# Patient Record
Sex: FEMALE | Race: OTHER | NOT HISPANIC OR LATINO | URBAN - METROPOLITAN AREA
[De-identification: names, ages, dates, MRNs, and addresses within clinical notes are randomized per-mention and may not be internally consistent; named-entity substitution may affect disease eponyms.]

---

## 2022-01-17 ENCOUNTER — INPATIENT (INPATIENT)
Facility: HOSPITAL | Age: 32
LOS: 5 days | Discharge: HOME | End: 2022-01-23
Attending: OBSTETRICS & GYNECOLOGY | Admitting: OBSTETRICS & GYNECOLOGY
Payer: MEDICAID

## 2022-01-17 VITALS — WEIGHT: 179.9 LBS | HEIGHT: 63 IN

## 2022-01-17 DIAGNOSIS — Z3A.00 WEEKS OF GESTATION OF PREGNANCY NOT SPECIFIED: ICD-10-CM

## 2022-01-17 DIAGNOSIS — O26.899 OTHER SPECIFIED PREGNANCY RELATED CONDITIONS, UNSPECIFIED TRIMESTER: ICD-10-CM

## 2022-01-17 DIAGNOSIS — Z34.82 ENCOUNTER FOR SUPERVISION OF OTHER NORMAL PREGNANCY, SECOND TRIMESTER: ICD-10-CM

## 2022-01-17 LAB
ALBUMIN SERPL ELPH-MCNC: 2.2 G/DL — LOW (ref 3.5–5)
ALP SERPL-CCNC: 306 U/L — HIGH (ref 40–120)
ALT FLD-CCNC: 46 U/L DA — SIGNIFICANT CHANGE UP (ref 10–60)
ANION GAP SERPL CALC-SCNC: 6 MMOL/L — SIGNIFICANT CHANGE UP (ref 5–17)
APPEARANCE UR: CLEAR — SIGNIFICANT CHANGE UP
APTT BLD: 28.5 SEC — SIGNIFICANT CHANGE UP (ref 27.5–35.5)
AST SERPL-CCNC: 27 U/L — SIGNIFICANT CHANGE UP (ref 10–40)
BASOPHILS # BLD AUTO: 0.03 K/UL — SIGNIFICANT CHANGE UP (ref 0–0.2)
BASOPHILS NFR BLD AUTO: 0.3 % — SIGNIFICANT CHANGE UP (ref 0–2)
BILIRUB SERPL-MCNC: 0.3 MG/DL — SIGNIFICANT CHANGE UP (ref 0.2–1.2)
BILIRUB UR-MCNC: NEGATIVE — SIGNIFICANT CHANGE UP
BLD GP AB SCN SERPL QL: SIGNIFICANT CHANGE UP
BUN SERPL-MCNC: 6 MG/DL — LOW (ref 7–18)
CALCIUM SERPL-MCNC: 8.2 MG/DL — LOW (ref 8.4–10.5)
CHLORIDE SERPL-SCNC: 112 MMOL/L — HIGH (ref 96–108)
CO2 SERPL-SCNC: 22 MMOL/L — SIGNIFICANT CHANGE UP (ref 22–31)
COLOR SPEC: YELLOW — SIGNIFICANT CHANGE UP
CREAT ?TM UR-MCNC: 120 MG/DL — SIGNIFICANT CHANGE UP
CREAT SERPL-MCNC: 0.53 MG/DL — SIGNIFICANT CHANGE UP (ref 0.5–1.3)
DIFF PNL FLD: NEGATIVE — SIGNIFICANT CHANGE UP
EOSINOPHIL # BLD AUTO: 0.22 K/UL — SIGNIFICANT CHANGE UP (ref 0–0.5)
EOSINOPHIL NFR BLD AUTO: 2.1 % — SIGNIFICANT CHANGE UP (ref 0–6)
FIBRINOGEN PPP-MCNC: 828 MG/DL — HIGH (ref 290–520)
GLUCOSE SERPL-MCNC: 79 MG/DL — SIGNIFICANT CHANGE UP (ref 70–99)
GLUCOSE UR QL: NEGATIVE — SIGNIFICANT CHANGE UP
HCT VFR BLD CALC: 27 % — LOW (ref 34.5–45)
HGB BLD-MCNC: 8.2 G/DL — LOW (ref 11.5–15.5)
HIV 1 & 2 AB SERPL IA.RAPID: SIGNIFICANT CHANGE UP
IMM GRANULOCYTES NFR BLD AUTO: 0.6 % — SIGNIFICANT CHANGE UP (ref 0–1.5)
INR BLD: 0.89 RATIO — SIGNIFICANT CHANGE UP (ref 0.88–1.16)
KETONES UR-MCNC: NEGATIVE — SIGNIFICANT CHANGE UP
LDH SERPL L TO P-CCNC: 191 U/L — SIGNIFICANT CHANGE UP (ref 120–225)
LEUKOCYTE ESTERASE UR-ACNC: ABNORMAL
LYMPHOCYTES # BLD AUTO: 1.89 K/UL — SIGNIFICANT CHANGE UP (ref 1–3.3)
LYMPHOCYTES # BLD AUTO: 17.8 % — SIGNIFICANT CHANGE UP (ref 13–44)
MCHC RBC-ENTMCNC: 23.3 PG — LOW (ref 27–34)
MCHC RBC-ENTMCNC: 30.4 GM/DL — LOW (ref 32–36)
MCV RBC AUTO: 76.7 FL — LOW (ref 80–100)
MONOCYTES # BLD AUTO: 0.94 K/UL — HIGH (ref 0–0.9)
MONOCYTES NFR BLD AUTO: 8.8 % — SIGNIFICANT CHANGE UP (ref 2–14)
NEUTROPHILS # BLD AUTO: 7.5 K/UL — HIGH (ref 1.8–7.4)
NEUTROPHILS NFR BLD AUTO: 70.4 % — SIGNIFICANT CHANGE UP (ref 43–77)
NITRITE UR-MCNC: NEGATIVE — SIGNIFICANT CHANGE UP
NRBC # BLD: 0 /100 WBCS — SIGNIFICANT CHANGE UP (ref 0–0)
PH UR: 6.5 — SIGNIFICANT CHANGE UP (ref 5–8)
PLATELET # BLD AUTO: 395 K/UL — SIGNIFICANT CHANGE UP (ref 150–400)
POTASSIUM SERPL-MCNC: 3.8 MMOL/L — SIGNIFICANT CHANGE UP (ref 3.5–5.3)
POTASSIUM SERPL-SCNC: 3.8 MMOL/L — SIGNIFICANT CHANGE UP (ref 3.5–5.3)
PROT ?TM UR-MCNC: 39 MG/DL — HIGH (ref 0–12)
PROT SERPL-MCNC: 6.6 G/DL — SIGNIFICANT CHANGE UP (ref 6–8.3)
PROT UR-MCNC: 15
PROTHROM AB SERPL-ACNC: 10.6 SEC — SIGNIFICANT CHANGE UP (ref 10.6–13.6)
RBC # BLD: 3.52 M/UL — LOW (ref 3.8–5.2)
RBC # FLD: 16.9 % — HIGH (ref 10.3–14.5)
SARS-COV-2 RNA SPEC QL NAA+PROBE: DETECTED
SODIUM SERPL-SCNC: 140 MMOL/L — SIGNIFICANT CHANGE UP (ref 135–145)
SP GR SPEC: 1.01 — SIGNIFICANT CHANGE UP (ref 1.01–1.02)
URATE SERPL-MCNC: 2.6 MG/DL — SIGNIFICANT CHANGE UP (ref 2.5–7)
UROBILINOGEN FLD QL: NEGATIVE — SIGNIFICANT CHANGE UP
WBC # BLD: 10.64 K/UL — HIGH (ref 3.8–10.5)
WBC # FLD AUTO: 10.64 K/UL — HIGH (ref 3.8–10.5)

## 2022-01-17 RX ORDER — FERROUS SULFATE 325(65) MG
325 TABLET ORAL DAILY
Refills: 0 | Status: DISCONTINUED | OUTPATIENT
Start: 2022-01-17 | End: 2022-01-22

## 2022-01-17 RX ORDER — SODIUM CHLORIDE 9 MG/ML
1000 INJECTION, SOLUTION INTRAVENOUS ONCE
Refills: 0 | Status: DISCONTINUED | OUTPATIENT
Start: 2022-01-17 | End: 2022-01-17

## 2022-01-17 RX ORDER — CEFAZOLIN SODIUM 1 G
2000 VIAL (EA) INJECTION ONCE
Refills: 0 | Status: DISCONTINUED | OUTPATIENT
Start: 2022-01-17 | End: 2022-01-17

## 2022-01-17 RX ORDER — OXYTOCIN 10 UNIT/ML
333.33 VIAL (ML) INJECTION
Qty: 20 | Refills: 0 | Status: DISCONTINUED | OUTPATIENT
Start: 2022-01-17 | End: 2022-01-17

## 2022-01-17 RX ORDER — SODIUM CHLORIDE 9 MG/ML
1000 INJECTION, SOLUTION INTRAVENOUS
Refills: 0 | Status: DISCONTINUED | OUTPATIENT
Start: 2022-01-17 | End: 2022-01-18

## 2022-01-17 RX ORDER — CITRIC ACID/SODIUM CITRATE 300-500 MG
30 SOLUTION, ORAL ORAL ONCE
Refills: 0 | Status: DISCONTINUED | OUTPATIENT
Start: 2022-01-17 | End: 2022-01-17

## 2022-01-17 RX ORDER — FAMOTIDINE 10 MG/ML
20 INJECTION INTRAVENOUS ONCE
Refills: 0 | Status: DISCONTINUED | OUTPATIENT
Start: 2022-01-17 | End: 2022-01-17

## 2022-01-17 RX ADMIN — Medication 12 MILLIGRAM(S): at 16:45

## 2022-01-17 NOTE — PATIENT PROFILE OB - FALL HARM RISK - UNIVERSAL INTERVENTIONS
Bed in lowest position, wheels locked, appropriate side rails in place/Call bell, personal items and telephone in reach/Instruct patient to call for assistance before getting out of bed or chair/Non-slip footwear when patient is out of bed/[place] to call system/Physically safe environment - no spills, clutter or unnecessary equipment/Purposeful Proactive Rounding/Room/bathroom lighting operational, light cord in reach

## 2022-01-18 LAB
ABO RH CONFIRMATION: SIGNIFICANT CHANGE UP
ALBUMIN SERPL ELPH-MCNC: 2.1 G/DL — LOW (ref 3.5–5)
ALP SERPL-CCNC: 298 U/L — HIGH (ref 40–120)
ALT FLD-CCNC: 48 U/L DA — SIGNIFICANT CHANGE UP (ref 10–60)
ANION GAP SERPL CALC-SCNC: 9 MMOL/L — SIGNIFICANT CHANGE UP (ref 5–17)
AST SERPL-CCNC: 24 U/L — SIGNIFICANT CHANGE UP (ref 10–40)
BASOPHILS # BLD AUTO: 0.02 K/UL — SIGNIFICANT CHANGE UP (ref 0–0.2)
BASOPHILS NFR BLD AUTO: 0.2 % — SIGNIFICANT CHANGE UP (ref 0–2)
BILIRUB SERPL-MCNC: 0.4 MG/DL — SIGNIFICANT CHANGE UP (ref 0.2–1.2)
BUN SERPL-MCNC: 5 MG/DL — LOW (ref 7–18)
CALCIUM SERPL-MCNC: 9.1 MG/DL — SIGNIFICANT CHANGE UP (ref 8.4–10.5)
CHLORIDE SERPL-SCNC: 108 MMOL/L — SIGNIFICANT CHANGE UP (ref 96–108)
CO2 SERPL-SCNC: 22 MMOL/L — SIGNIFICANT CHANGE UP (ref 22–31)
COLLECT DURATION TIME UR: 24 HR — SIGNIFICANT CHANGE UP
CREAT SERPL-MCNC: 0.51 MG/DL — SIGNIFICANT CHANGE UP (ref 0.5–1.3)
EOSINOPHIL # BLD AUTO: 0 K/UL — SIGNIFICANT CHANGE UP (ref 0–0.5)
EOSINOPHIL NFR BLD AUTO: 0 % — SIGNIFICANT CHANGE UP (ref 0–6)
GLUCOSE SERPL-MCNC: 90 MG/DL — SIGNIFICANT CHANGE UP (ref 70–99)
HCT VFR BLD CALC: 27.3 % — LOW (ref 34.5–45)
HGB BLD-MCNC: 8.5 G/DL — LOW (ref 11.5–15.5)
IMM GRANULOCYTES NFR BLD AUTO: 1.1 % — SIGNIFICANT CHANGE UP (ref 0–1.5)
LYMPHOCYTES # BLD AUTO: 1.94 K/UL — SIGNIFICANT CHANGE UP (ref 1–3.3)
LYMPHOCYTES # BLD AUTO: 17.2 % — SIGNIFICANT CHANGE UP (ref 13–44)
MCHC RBC-ENTMCNC: 23.9 PG — LOW (ref 27–34)
MCHC RBC-ENTMCNC: 31.1 GM/DL — LOW (ref 32–36)
MCV RBC AUTO: 76.7 FL — LOW (ref 80–100)
MONOCYTES # BLD AUTO: 0.97 K/UL — HIGH (ref 0–0.9)
MONOCYTES NFR BLD AUTO: 8.6 % — SIGNIFICANT CHANGE UP (ref 2–14)
NEUTROPHILS # BLD AUTO: 8.26 K/UL — HIGH (ref 1.8–7.4)
NEUTROPHILS NFR BLD AUTO: 72.9 % — SIGNIFICANT CHANGE UP (ref 43–77)
NRBC # BLD: 0 /100 WBCS — SIGNIFICANT CHANGE UP (ref 0–0)
PLATELET # BLD AUTO: 418 K/UL — HIGH (ref 150–400)
POTASSIUM SERPL-MCNC: 4 MMOL/L — SIGNIFICANT CHANGE UP (ref 3.5–5.3)
POTASSIUM SERPL-SCNC: 4 MMOL/L — SIGNIFICANT CHANGE UP (ref 3.5–5.3)
PROT 24H UR-MRATE: 200 MG/24 H — HIGH (ref 50–100)
PROT SERPL-MCNC: 6.7 G/DL — SIGNIFICANT CHANGE UP (ref 6–8.3)
RBC # BLD: 3.56 M/UL — LOW (ref 3.8–5.2)
RBC # FLD: 16.9 % — HIGH (ref 10.3–14.5)
SODIUM SERPL-SCNC: 139 MMOL/L — SIGNIFICANT CHANGE UP (ref 135–145)
T PALLIDUM AB TITR SER: NEGATIVE — SIGNIFICANT CHANGE UP
TOTAL VOLUME - 24 HOUR: 1000 ML — SIGNIFICANT CHANGE UP
URINE CREATININE CALCULATION: 0.6 G/24 H — SIGNIFICANT CHANGE UP (ref 0.6–2.5)
WBC # BLD: 11.31 K/UL — HIGH (ref 3.8–10.5)
WBC # FLD AUTO: 11.31 K/UL — HIGH (ref 3.8–10.5)

## 2022-01-18 PROCEDURE — 76815 OB US LIMITED FETUS(S): CPT | Mod: 26

## 2022-01-18 PROCEDURE — 76818 FETAL BIOPHYS PROFILE W/NST: CPT | Mod: 26

## 2022-01-18 RX ORDER — SODIUM CHLORIDE 9 MG/ML
1000 INJECTION, SOLUTION INTRAVENOUS
Refills: 0 | Status: DISCONTINUED | OUTPATIENT
Start: 2022-01-18 | End: 2022-01-20

## 2022-01-18 RX ADMIN — Medication 325 MILLIGRAM(S): at 12:10

## 2022-01-18 RX ADMIN — Medication 1 TABLET(S): at 12:11

## 2022-01-18 RX ADMIN — SODIUM CHLORIDE 75 MILLILITER(S): 9 INJECTION, SOLUTION INTRAVENOUS at 17:10

## 2022-01-18 RX ADMIN — Medication 12 MILLIGRAM(S): at 16:19

## 2022-01-19 LAB
ALBUMIN SERPL ELPH-MCNC: 2.3 G/DL — LOW (ref 3.5–5)
ALP SERPL-CCNC: 284 U/L — HIGH (ref 40–120)
ALT FLD-CCNC: 56 U/L DA — SIGNIFICANT CHANGE UP (ref 10–60)
ANION GAP SERPL CALC-SCNC: 8 MMOL/L — SIGNIFICANT CHANGE UP (ref 5–17)
ANISOCYTOSIS BLD QL: SLIGHT — SIGNIFICANT CHANGE UP
APTT BLD: 25 SEC — LOW (ref 27.5–35.5)
AST SERPL-CCNC: 31 U/L — SIGNIFICANT CHANGE UP (ref 10–40)
BASOPHILS # BLD AUTO: 0 K/UL — SIGNIFICANT CHANGE UP (ref 0–0.2)
BASOPHILS NFR BLD AUTO: 0 % — SIGNIFICANT CHANGE UP (ref 0–2)
BILIRUB SERPL-MCNC: 0.2 MG/DL — SIGNIFICANT CHANGE UP (ref 0.2–1.2)
BUN SERPL-MCNC: 6 MG/DL — LOW (ref 7–18)
CALCIUM SERPL-MCNC: 9 MG/DL — SIGNIFICANT CHANGE UP (ref 8.4–10.5)
CHLORIDE SERPL-SCNC: 110 MMOL/L — HIGH (ref 96–108)
CO2 SERPL-SCNC: 22 MMOL/L — SIGNIFICANT CHANGE UP (ref 22–31)
CREAT SERPL-MCNC: 0.63 MG/DL — SIGNIFICANT CHANGE UP (ref 0.5–1.3)
EOSINOPHIL # BLD AUTO: 0 K/UL — SIGNIFICANT CHANGE UP (ref 0–0.5)
EOSINOPHIL NFR BLD AUTO: 0 % — SIGNIFICANT CHANGE UP (ref 0–6)
FIBRINOGEN PPP-MCNC: 754 MG/DL — SIGNIFICANT CHANGE UP (ref 290–520)
GIANT PLATELETS BLD QL SMEAR: PRESENT — SIGNIFICANT CHANGE UP
GLUCOSE SERPL-MCNC: 106 MG/DL — HIGH (ref 70–99)
HCT VFR BLD CALC: 27.5 % — LOW (ref 34.5–45)
HGB BLD-MCNC: 8.3 G/DL — LOW (ref 11.5–15.5)
INR BLD: 0.93 RATIO — SIGNIFICANT CHANGE UP (ref 0.88–1.16)
LDH SERPL L TO P-CCNC: 155 U/L — SIGNIFICANT CHANGE UP (ref 120–225)
LYMPHOCYTES # BLD AUTO: 2.35 K/UL — SIGNIFICANT CHANGE UP (ref 1–3.3)
LYMPHOCYTES # BLD AUTO: 21 % — SIGNIFICANT CHANGE UP (ref 13–44)
MACROCYTES BLD QL: SLIGHT — SIGNIFICANT CHANGE UP
MANUAL SMEAR VERIFICATION: SIGNIFICANT CHANGE UP
MCHC RBC-ENTMCNC: 23.6 PG — LOW (ref 27–34)
MCHC RBC-ENTMCNC: 30.2 GM/DL — LOW (ref 32–36)
MCV RBC AUTO: 78.1 FL — LOW (ref 80–100)
METAMYELOCYTES # FLD: 1 % — HIGH (ref 0–0)
MICROCYTES BLD QL: SIGNIFICANT CHANGE UP
MONOCYTES # BLD AUTO: 1.23 K/UL — HIGH (ref 0–0.9)
MONOCYTES NFR BLD AUTO: 11 % — SIGNIFICANT CHANGE UP (ref 2–14)
NEUTROPHILS # BLD AUTO: 7.48 K/UL — HIGH (ref 1.8–7.4)
NEUTROPHILS NFR BLD AUTO: 67 % — SIGNIFICANT CHANGE UP (ref 43–77)
NRBC # BLD: 1 /100 — HIGH (ref 0–0)
PLAT MORPH BLD: NORMAL — SIGNIFICANT CHANGE UP
PLATELET # BLD AUTO: 384 K/UL — SIGNIFICANT CHANGE UP (ref 150–400)
PLATELET CLUMP BLD QL SMEAR: SLIGHT
POIKILOCYTOSIS BLD QL AUTO: SLIGHT — SIGNIFICANT CHANGE UP
POLYCHROMASIA BLD QL SMEAR: SLIGHT — SIGNIFICANT CHANGE UP
POTASSIUM SERPL-MCNC: 4.1 MMOL/L — SIGNIFICANT CHANGE UP (ref 3.5–5.3)
POTASSIUM SERPL-SCNC: 4.1 MMOL/L — SIGNIFICANT CHANGE UP (ref 3.5–5.3)
PROT SERPL-MCNC: 6.6 G/DL — SIGNIFICANT CHANGE UP (ref 6–8.3)
PROTHROM AB SERPL-ACNC: 11.1 SEC — SIGNIFICANT CHANGE UP (ref 10.6–13.6)
RBC # BLD: 3.52 M/UL — LOW (ref 3.8–5.2)
RBC # FLD: 17.6 % — HIGH (ref 10.3–14.5)
RBC BLD AUTO: ABNORMAL
SMUDGE CELLS # BLD: PRESENT — SIGNIFICANT CHANGE UP
SODIUM SERPL-SCNC: 140 MMOL/L — SIGNIFICANT CHANGE UP (ref 135–145)
SPHEROCYTES BLD QL SMEAR: SLIGHT — SIGNIFICANT CHANGE UP
URATE SERPL-MCNC: 3.1 MG/DL — SIGNIFICANT CHANGE UP (ref 2.5–7)
WBC # BLD: 11.17 K/UL — HIGH (ref 3.8–10.5)
WBC # FLD AUTO: 11.17 K/UL — HIGH (ref 3.8–10.5)

## 2022-01-19 RX ADMIN — SODIUM CHLORIDE 75 MILLILITER(S): 9 INJECTION, SOLUTION INTRAVENOUS at 20:12

## 2022-01-19 RX ADMIN — Medication 325 MILLIGRAM(S): at 18:44

## 2022-01-19 RX ADMIN — SODIUM CHLORIDE 75 MILLILITER(S): 9 INJECTION, SOLUTION INTRAVENOUS at 06:30

## 2022-01-19 RX ADMIN — Medication 1 TABLET(S): at 18:44

## 2022-01-20 RX ADMIN — Medication 325 MILLIGRAM(S): at 11:39

## 2022-01-20 RX ADMIN — Medication 1 TABLET(S): at 11:40

## 2022-01-21 PROCEDURE — 88307 TISSUE EXAM BY PATHOLOGIST: CPT | Mod: 26

## 2022-01-21 RX ORDER — CEFAZOLIN SODIUM 1 G
2000 VIAL (EA) INJECTION ONCE
Refills: 0 | Status: COMPLETED | OUTPATIENT
Start: 2022-01-21 | End: 2022-01-21

## 2022-01-21 RX ORDER — ONDANSETRON 8 MG/1
4 TABLET, FILM COATED ORAL ONCE
Refills: 0 | Status: COMPLETED | OUTPATIENT
Start: 2022-01-21 | End: 2022-01-21

## 2022-01-21 RX ORDER — SODIUM CHLORIDE 9 MG/ML
1000 INJECTION, SOLUTION INTRAVENOUS
Refills: 0 | Status: DISCONTINUED | OUTPATIENT
Start: 2022-01-21 | End: 2022-01-21

## 2022-01-21 RX ORDER — SODIUM CHLORIDE 9 MG/ML
1000 INJECTION, SOLUTION INTRAVENOUS
Refills: 0 | Status: DISCONTINUED | OUTPATIENT
Start: 2022-01-21 | End: 2022-01-23

## 2022-01-21 RX ORDER — FERROUS SULFATE 325(65) MG
325 TABLET ORAL DAILY
Refills: 0 | Status: DISCONTINUED | OUTPATIENT
Start: 2022-01-21 | End: 2022-01-22

## 2022-01-21 RX ORDER — DIPHENHYDRAMINE HCL 50 MG
25 CAPSULE ORAL EVERY 6 HOURS
Refills: 0 | Status: DISCONTINUED | OUTPATIENT
Start: 2022-01-21 | End: 2022-01-23

## 2022-01-21 RX ORDER — FOLIC ACID 0.8 MG
1 TABLET ORAL DAILY
Refills: 0 | Status: DISCONTINUED | OUTPATIENT
Start: 2022-01-21 | End: 2022-01-23

## 2022-01-21 RX ORDER — OXYCODONE HYDROCHLORIDE 5 MG/1
5 TABLET ORAL
Refills: 0 | Status: DISCONTINUED | OUTPATIENT
Start: 2022-01-21 | End: 2022-01-23

## 2022-01-21 RX ORDER — IBUPROFEN 200 MG
600 TABLET ORAL EVERY 6 HOURS
Refills: 0 | Status: COMPLETED | OUTPATIENT
Start: 2022-01-21 | End: 2022-12-20

## 2022-01-21 RX ORDER — MAGNESIUM HYDROXIDE 400 MG/1
30 TABLET, CHEWABLE ORAL
Refills: 0 | Status: DISCONTINUED | OUTPATIENT
Start: 2022-01-21 | End: 2022-01-23

## 2022-01-21 RX ORDER — TETANUS TOXOID, REDUCED DIPHTHERIA TOXOID AND ACELLULAR PERTUSSIS VACCINE, ADSORBED 5; 2.5; 8; 8; 2.5 [IU]/.5ML; [IU]/.5ML; UG/.5ML; UG/.5ML; UG/.5ML
0.5 SUSPENSION INTRAMUSCULAR ONCE
Refills: 0 | Status: DISCONTINUED | OUTPATIENT
Start: 2022-01-21 | End: 2022-01-23

## 2022-01-21 RX ORDER — LANOLIN
1 OINTMENT (GRAM) TOPICAL EVERY 6 HOURS
Refills: 0 | Status: DISCONTINUED | OUTPATIENT
Start: 2022-01-21 | End: 2022-01-23

## 2022-01-21 RX ORDER — ENOXAPARIN SODIUM 100 MG/ML
40 INJECTION SUBCUTANEOUS EVERY 24 HOURS
Refills: 0 | Status: DISCONTINUED | OUTPATIENT
Start: 2022-01-22 | End: 2022-01-23

## 2022-01-21 RX ORDER — OXYTOCIN 10 UNIT/ML
333.33 VIAL (ML) INJECTION
Qty: 20 | Refills: 0 | Status: DISCONTINUED | OUTPATIENT
Start: 2022-01-21 | End: 2022-01-23

## 2022-01-21 RX ORDER — CITRIC ACID/SODIUM CITRATE 300-500 MG
30 SOLUTION, ORAL ORAL ONCE
Refills: 0 | Status: COMPLETED | OUTPATIENT
Start: 2022-01-21 | End: 2022-01-21

## 2022-01-21 RX ORDER — KETOROLAC TROMETHAMINE 30 MG/ML
30 SYRINGE (ML) INJECTION EVERY 6 HOURS
Refills: 0 | Status: COMPLETED | OUTPATIENT
Start: 2022-01-21 | End: 2022-01-22

## 2022-01-21 RX ORDER — ACETAMINOPHEN 500 MG
975 TABLET ORAL EVERY 6 HOURS
Refills: 0 | Status: DISCONTINUED | OUTPATIENT
Start: 2022-01-21 | End: 2022-01-23

## 2022-01-21 RX ORDER — SIMETHICONE 80 MG/1
80 TABLET, CHEWABLE ORAL EVERY 4 HOURS
Refills: 0 | Status: DISCONTINUED | OUTPATIENT
Start: 2022-01-21 | End: 2022-01-23

## 2022-01-21 RX ADMIN — Medication 30 MILLILITER(S): at 14:14

## 2022-01-21 RX ADMIN — Medication 975 MILLIGRAM(S): at 21:25

## 2022-01-21 RX ADMIN — Medication 1000 MILLIUNIT(S)/MIN: at 15:03

## 2022-01-21 RX ADMIN — Medication 30 MILLIGRAM(S): at 19:50

## 2022-01-21 RX ADMIN — Medication 30 MILLIGRAM(S): at 20:20

## 2022-01-21 RX ADMIN — Medication 100 MILLIGRAM(S): at 14:20

## 2022-01-21 RX ADMIN — ONDANSETRON 4 MILLIGRAM(S): 8 TABLET, FILM COATED ORAL at 18:21

## 2022-01-21 RX ADMIN — Medication 975 MILLIGRAM(S): at 22:00

## 2022-01-21 NOTE — BRIEF OPERATIVE NOTE - NSICDXBRIEFPOSTOP_GEN_ALL_CORE_FT
POST-OP DIAGNOSIS:  37 weeks gestation of pregnancy 21-Jan-2022 15:42:11  Anai Mercado  Preeclampsia 21-Jan-2022 15:42:20  Anai Mercado

## 2022-01-21 NOTE — BRIEF OPERATIVE NOTE - NSICDXBRIEFPREOP_GEN_ALL_CORE_FT
PRE-OP DIAGNOSIS:  37 weeks gestation of pregnancy 21-Jan-2022 15:41:44  Anai Mercado  Preeclampsia 21-Jan-2022 15:41:56  Anai Mercado

## 2022-01-21 NOTE — CHART NOTE - NSCHARTNOTEFT_GEN_A_CORE
Pt seen at bedside resting, c/o incisional pain and nausea.  Ulloa catheter in place draining adequate urine. Pt denies CP, SOB, N/V, HA, blurry vision, dizziness, palpitations or any other complaints.    T(C): 36.9 (01-21-22 @ 19:45), Max: 37.2 (01-21-22 @ 08:20)  HR: 100 (01-21-22 @ 19:45) (77 - 134)  BP: 145/69 (01-21-22 @ 19:45) (121/73 - 177/74)  RR: 18 (01-21-22 @ 19:45) (16 - 20)  SpO2: 100% (01-21-22 @ 19:45) (98% - 100%)    abd: soft/nt, fundus firm, dressing in place C/D/I  pelvic: minimal lochia  ext: venodynes in place; no calf pain    A/p:  POD #0 s/p primary c/s on maternal demand @ 37wks, PEC without severe features , pt stable   -cont close monitor   -cont post op care  -pain mgmt   -dc ulloa am  -cbc am   -case management to set up home care   -Dr. Mercado aware

## 2022-01-22 DIAGNOSIS — D62 ACUTE POSTHEMORRHAGIC ANEMIA: ICD-10-CM

## 2022-01-22 DIAGNOSIS — O14.93 UNSPECIFIED PRE-ECLAMPSIA, THIRD TRIMESTER: ICD-10-CM

## 2022-01-22 DIAGNOSIS — U07.1 COVID-19: ICD-10-CM

## 2022-01-22 LAB
ALBUMIN SERPL ELPH-MCNC: 2.1 G/DL — LOW (ref 3.5–5)
ALP SERPL-CCNC: 212 U/L — HIGH (ref 40–120)
ALT FLD-CCNC: 37 U/L DA — SIGNIFICANT CHANGE UP (ref 10–60)
ANION GAP SERPL CALC-SCNC: 7 MMOL/L — SIGNIFICANT CHANGE UP (ref 5–17)
AST SERPL-CCNC: 23 U/L — SIGNIFICANT CHANGE UP (ref 10–40)
BASOPHILS # BLD AUTO: 0.07 K/UL — SIGNIFICANT CHANGE UP (ref 0–0.2)
BASOPHILS NFR BLD AUTO: 0.4 % — SIGNIFICANT CHANGE UP (ref 0–2)
BILIRUB SERPL-MCNC: 0.3 MG/DL — SIGNIFICANT CHANGE UP (ref 0.2–1.2)
BUN SERPL-MCNC: 8 MG/DL — SIGNIFICANT CHANGE UP (ref 7–18)
CALCIUM SERPL-MCNC: 8.5 MG/DL — SIGNIFICANT CHANGE UP (ref 8.4–10.5)
CHLORIDE SERPL-SCNC: 106 MMOL/L — SIGNIFICANT CHANGE UP (ref 96–108)
CO2 SERPL-SCNC: 25 MMOL/L — SIGNIFICANT CHANGE UP (ref 22–31)
CREAT SERPL-MCNC: 0.53 MG/DL — SIGNIFICANT CHANGE UP (ref 0.5–1.3)
EOSINOPHIL # BLD AUTO: 0.11 K/UL — SIGNIFICANT CHANGE UP (ref 0–0.5)
EOSINOPHIL NFR BLD AUTO: 0.6 % — SIGNIFICANT CHANGE UP (ref 0–6)
GLUCOSE SERPL-MCNC: 66 MG/DL — LOW (ref 70–99)
HCT VFR BLD CALC: 23 % — LOW (ref 34.5–45)
HGB BLD-MCNC: 7.2 G/DL — LOW (ref 11.5–15.5)
IMM GRANULOCYTES NFR BLD AUTO: 0.5 % — SIGNIFICANT CHANGE UP (ref 0–1.5)
LYMPHOCYTES # BLD AUTO: 12.3 % — LOW (ref 13–44)
LYMPHOCYTES # BLD AUTO: 2.31 K/UL — SIGNIFICANT CHANGE UP (ref 1–3.3)
MCHC RBC-ENTMCNC: 24 PG — LOW (ref 27–34)
MCHC RBC-ENTMCNC: 31.3 GM/DL — LOW (ref 32–36)
MCV RBC AUTO: 76.7 FL — LOW (ref 80–100)
MONOCYTES # BLD AUTO: 1.64 K/UL — HIGH (ref 0–0.9)
MONOCYTES NFR BLD AUTO: 8.7 % — SIGNIFICANT CHANGE UP (ref 2–14)
NEUTROPHILS # BLD AUTO: 14.61 K/UL — HIGH (ref 1.8–7.4)
NEUTROPHILS NFR BLD AUTO: 77.5 % — HIGH (ref 43–77)
NRBC # BLD: 0 /100 WBCS — SIGNIFICANT CHANGE UP (ref 0–0)
PLATELET # BLD AUTO: 383 K/UL — SIGNIFICANT CHANGE UP (ref 150–400)
POTASSIUM SERPL-MCNC: 4.6 MMOL/L — SIGNIFICANT CHANGE UP (ref 3.5–5.3)
POTASSIUM SERPL-SCNC: 4.6 MMOL/L — SIGNIFICANT CHANGE UP (ref 3.5–5.3)
PROT SERPL-MCNC: 5.5 G/DL — LOW (ref 6–8.3)
RBC # BLD: 3 M/UL — LOW (ref 3.8–5.2)
RBC # FLD: 17.6 % — HIGH (ref 10.3–14.5)
SODIUM SERPL-SCNC: 138 MMOL/L — SIGNIFICANT CHANGE UP (ref 135–145)
WBC # BLD: 18.84 K/UL — HIGH (ref 3.8–10.5)
WBC # FLD AUTO: 18.84 K/UL — HIGH (ref 3.8–10.5)

## 2022-01-22 RX ORDER — SENNA PLUS 8.6 MG/1
2 TABLET ORAL AT BEDTIME
Refills: 0 | Status: DISCONTINUED | OUTPATIENT
Start: 2022-01-22 | End: 2022-01-23

## 2022-01-22 RX ORDER — IBUPROFEN 200 MG
1 TABLET ORAL
Qty: 120 | Refills: 0
Start: 2022-01-22 | End: 2022-02-20

## 2022-01-22 RX ORDER — FERROUS SULFATE 325(65) MG
1 TABLET ORAL
Qty: 60 | Refills: 0
Start: 2022-01-22 | End: 2022-02-20

## 2022-01-22 RX ORDER — FERROUS SULFATE 325(65) MG
325 TABLET ORAL
Refills: 0 | Status: DISCONTINUED | OUTPATIENT
Start: 2022-01-22 | End: 2022-01-23

## 2022-01-22 RX ORDER — SENNA PLUS 8.6 MG/1
1 TABLET ORAL
Qty: 30 | Refills: 0
Start: 2022-01-22 | End: 2022-02-20

## 2022-01-22 RX ORDER — ASCORBIC ACID 60 MG
1 TABLET,CHEWABLE ORAL
Qty: 30 | Refills: 0
Start: 2022-01-22 | End: 2022-02-20

## 2022-01-22 RX ORDER — DOCUSATE SODIUM 100 MG
1 CAPSULE ORAL
Qty: 60 | Refills: 0
Start: 2022-01-22 | End: 2022-02-20

## 2022-01-22 RX ORDER — IBUPROFEN 200 MG
600 TABLET ORAL EVERY 6 HOURS
Refills: 0 | Status: DISCONTINUED | OUTPATIENT
Start: 2022-01-22 | End: 2022-01-23

## 2022-01-22 RX ORDER — ASCORBIC ACID 60 MG
500 TABLET,CHEWABLE ORAL DAILY
Refills: 0 | Status: DISCONTINUED | OUTPATIENT
Start: 2022-01-22 | End: 2022-01-23

## 2022-01-22 RX ADMIN — Medication 975 MILLIGRAM(S): at 13:18

## 2022-01-22 RX ADMIN — Medication 325 MILLIGRAM(S): at 18:04

## 2022-01-22 RX ADMIN — Medication 1 TABLET(S): at 12:47

## 2022-01-22 RX ADMIN — Medication 975 MILLIGRAM(S): at 18:04

## 2022-01-22 RX ADMIN — Medication 600 MILLIGRAM(S): at 15:31

## 2022-01-22 RX ADMIN — Medication 1 MILLIGRAM(S): at 12:46

## 2022-01-22 RX ADMIN — Medication 975 MILLIGRAM(S): at 19:16

## 2022-01-22 RX ADMIN — Medication 975 MILLIGRAM(S): at 12:46

## 2022-01-22 RX ADMIN — Medication 975 MILLIGRAM(S): at 07:10

## 2022-01-22 RX ADMIN — Medication 500 MILLIGRAM(S): at 12:46

## 2022-01-22 RX ADMIN — Medication 30 MILLIGRAM(S): at 09:33

## 2022-01-22 RX ADMIN — Medication 30 MILLIGRAM(S): at 10:59

## 2022-01-22 RX ADMIN — Medication 975 MILLIGRAM(S): at 06:35

## 2022-01-22 RX ADMIN — Medication 975 MILLIGRAM(S): at 23:52

## 2022-01-22 RX ADMIN — Medication 30 MILLIGRAM(S): at 02:43

## 2022-01-22 RX ADMIN — Medication 600 MILLIGRAM(S): at 16:59

## 2022-01-22 RX ADMIN — ENOXAPARIN SODIUM 40 MILLIGRAM(S): 100 INJECTION SUBCUTANEOUS at 06:00

## 2022-01-22 RX ADMIN — Medication 30 MILLIGRAM(S): at 02:03

## 2022-01-22 NOTE — DISCHARGE NOTE OB - MATERIALS PROVIDED
Vaccinations/[facility]  Screening Program/  Immunization Record/Breastfeeding Log/Bottle Feeding Log/Breastfeeding Mother’s Support Group Information/Guide to Postpartum Care/[facility] Hearing Screen Program/Back To Sleep Handout/Shaken Baby Prevention Handout/Breastfeeding Guide and Packet/Birth Certificate Instructions

## 2022-01-22 NOTE — PROGRESS NOTE ADULT - ASSESSMENT
a/p siup @ 37 weeks, gestational hypertension,  covid positive otherwise currently stable  - npo since midnight  - plan for  @ 2pm  - continue current care  Dr Mercado aware
A/P: 31 year old POD #1 s/p c/s, acute on chronic anemia, preeclampsia without severe features, covid pos

## 2022-01-22 NOTE — DISCHARGE NOTE OB - MEDICATION SUMMARY - MEDICATIONS TO TAKE
I will START or STAY ON the medications listed below when I get home from the hospital:    ibuprofen 600 mg oral tablet  -- 1 tab(s) by mouth every 6 hours  -- Do not take this drug if you are pregnant.  It is very important that you take or use this exactly as directed.  Do not skip doses or discontinue unless directed by your doctor.  May cause drowsiness or dizziness.  Obtain medical advice before taking any non-prescription drugs as some may affect the action of this medication.  Take with food or milk.    -- Indication: For Pain    ferrous sulfate 325 mg (65 mg elemental iron) oral tablet  -- 1 tab(s) by mouth 2 times a day  -- Check with your doctor before becoming pregnant.  Do not chew, break, or crush.  May discolor urine or feces.    -- Indication: For supplement    Colace 100 mg oral capsule  -- 1 cap(s) by mouth 2 times a day  -- Medication should be taken with plenty of water.    -- Indication: For COnstipation     senna 8.6 mg oral tablet  -- 1 tab(s) by mouth once a day (at bedtime)   -- Indication: For COnstipation     ascorbic acid 500 mg oral tablet, chewable  -- 1 tab(s) chewed once a day   -- Chew tablets before swallowing    -- Indication: For Acute on chronic blood loss anemia

## 2022-01-22 NOTE — DISCHARGE NOTE OB - PLAN OF CARE
check blood pressure twice daily. Return if any blood pressure >160/>100. Return if any headache, visual disturbances or epigastric pain. Please see obstetrician in 2-3days for blood pressure check. Case management set up take iron, folic acid, vitamin C, and prenatal vitamins. eat iron fortified food. Please take iron tablets two times daily. Return if any dizziness, shortness of breath, palpitations, chest pain or any other acute symptoms. Please have caution when holding baby to prevent any falls or dizziness with baby in arms. Continue breastfeeding.  Motrin as needed for pain.  Ambulate daily.  No heavy lifting or anything per vagina x 6 weeks - no sex, tampons, douching, tub baths, etc.  Follow up in office in 1-2 weeks for incision check, and then at 6 weeks for postpartum check. continue CDC covid precaution

## 2022-01-22 NOTE — DISCHARGE NOTE OB - NS MD DC FALL RISK RISK
For information on Fall & Injury Prevention, visit: [email or web address] OR  [email or web address] OR  https://www.cdc.gov/steadi/patient.html

## 2022-01-22 NOTE — DISCHARGE NOTE OB - CARE PLAN
1 Principal Discharge DX:	 delivery delivered  Assessment and plan of treatment:	Continue breastfeeding.  Motrin as needed for pain.  Ambulate daily.  No heavy lifting or anything per vagina x 6 weeks - no sex, tampons, douching, tub baths, etc.  Follow up in office in 1-2 weeks for incision check, and then at 6 weeks for postpartum check.  Secondary Diagnosis:	COVID-19  Assessment and plan of treatment:	continue CDC covid precaution  Secondary Diagnosis:	Preeclampsia, third trimester  Assessment and plan of treatment:	check blood pressure twice daily. Return if any blood pressure >160/>100. Return if any headache, visual disturbances or epigastric pain. Please see obstetrician in 2-3days for blood pressure check. Case management set up  Secondary Diagnosis:	Acute on chronic blood loss anemia  Assessment and plan of treatment:	take iron, folic acid, vitamin C, and prenatal vitamins. eat iron fortified food. Please take iron tablets two times daily. Return if any dizziness, shortness of breath, palpitations, chest pain or any other acute symptoms. Please have caution when holding baby to prevent any falls or dizziness with baby in arms.

## 2022-01-22 NOTE — PROGRESS NOTE ADULT - PROBLEM SELECTOR PLAN 1
-oral iron/vitamin C   -continue covid precautions   -Pain management as needed  -cont post op care  -adv diet to regular with flatus   -OOB and ambulate  -f/u Rpt CBC in am   -encourage insentive spirometer use  -Encourage breastfeeding   -case management   -d/w dr. Mercado

## 2022-01-22 NOTE — DISCHARGE NOTE OB - PATIENT PORTAL LINK FT
You can access the FollowMyHealth Patient Portal offered by [facility] by registering at the following website: [email or web address]. By joining [facility]’s FollowMyHealth portal, you will also be able to view your health information using other applications (apps) compatible with our system.

## 2022-01-22 NOTE — DISCHARGE NOTE OB - CARE PROVIDER_API CALL
Anai Mercado)  Obstetrics and Gynecology  [address] [place] [id number]  Phone: [phone]  Fax: [phone]  Follow Up Time:

## 2022-01-22 NOTE — DISCHARGE NOTE OB - HOSPITAL COURSE
Pt admitted at 36.3wks with preeclampsia without severe features. Pt was given betamethasone injection and observed for severe features. Pt had elective primary  at 37 weeks. Pt found with acute on chronic anemia started on iron and vitamin C. case management set up. Pt stable for dc home.

## 2022-01-23 VITALS
DIASTOLIC BLOOD PRESSURE: 90 MMHG | HEART RATE: 108 BPM | TEMPERATURE: 99 F | SYSTOLIC BLOOD PRESSURE: 135 MMHG | OXYGEN SATURATION: 96 % | RESPIRATION RATE: 18 BRPM

## 2022-01-23 LAB
BASOPHILS # BLD AUTO: 0.05 K/UL — SIGNIFICANT CHANGE UP (ref 0–0.2)
BASOPHILS NFR BLD AUTO: 0.3 % — SIGNIFICANT CHANGE UP (ref 0–2)
EOSINOPHIL # BLD AUTO: 0.18 K/UL — SIGNIFICANT CHANGE UP (ref 0–0.5)
EOSINOPHIL NFR BLD AUTO: 1 % — SIGNIFICANT CHANGE UP (ref 0–6)
HCT VFR BLD CALC: 23.5 % — LOW (ref 34.5–45)
HGB BLD-MCNC: 7.1 G/DL — LOW (ref 11.5–15.5)
IMM GRANULOCYTES NFR BLD AUTO: 0.7 % — SIGNIFICANT CHANGE UP (ref 0–1.5)
LYMPHOCYTES # BLD AUTO: 16.6 % — SIGNIFICANT CHANGE UP (ref 13–44)
LYMPHOCYTES # BLD AUTO: 2.97 K/UL — SIGNIFICANT CHANGE UP (ref 1–3.3)
MCHC RBC-ENTMCNC: 24.2 PG — LOW (ref 27–34)
MCHC RBC-ENTMCNC: 30.2 GM/DL — LOW (ref 32–36)
MCV RBC AUTO: 80.2 FL — SIGNIFICANT CHANGE UP (ref 80–100)
MONOCYTES # BLD AUTO: 1.4 K/UL — HIGH (ref 0–0.9)
MONOCYTES NFR BLD AUTO: 7.8 % — SIGNIFICANT CHANGE UP (ref 2–14)
NEUTROPHILS # BLD AUTO: 13.12 K/UL — HIGH (ref 1.8–7.4)
NEUTROPHILS NFR BLD AUTO: 73.6 % — SIGNIFICANT CHANGE UP (ref 43–77)
NRBC # BLD: 0 /100 WBCS — SIGNIFICANT CHANGE UP (ref 0–0)
PLATELET # BLD AUTO: 442 K/UL — HIGH (ref 150–400)
RBC # BLD: 2.93 M/UL — LOW (ref 3.8–5.2)
RBC # FLD: 18.4 % — HIGH (ref 10.3–14.5)
WBC # BLD: 17.84 K/UL — HIGH (ref 3.8–10.5)
WBC # FLD AUTO: 17.84 K/UL — HIGH (ref 3.8–10.5)

## 2022-01-23 PROCEDURE — 86900 BLOOD TYPING SEROLOGIC ABO: CPT

## 2022-01-23 PROCEDURE — 88307 TISSUE EXAM BY PATHOLOGIST: CPT

## 2022-01-23 PROCEDURE — 85610 PROTHROMBIN TIME: CPT

## 2022-01-23 PROCEDURE — 59050 FETAL MONITOR W/REPORT: CPT

## 2022-01-23 PROCEDURE — 83615 LACTATE (LD) (LDH) ENZYME: CPT

## 2022-01-23 PROCEDURE — 36415 COLL VENOUS BLD VENIPUNCTURE: CPT

## 2022-01-23 PROCEDURE — 87635 SARS-COV-2 COVID-19 AMP PRB: CPT

## 2022-01-23 PROCEDURE — C1765: CPT

## 2022-01-23 PROCEDURE — 84156 ASSAY OF PROTEIN URINE: CPT

## 2022-01-23 PROCEDURE — 84550 ASSAY OF BLOOD/URIC ACID: CPT

## 2022-01-23 PROCEDURE — 85384 FIBRINOGEN ACTIVITY: CPT

## 2022-01-23 PROCEDURE — 59025 FETAL NON-STRESS TEST: CPT

## 2022-01-23 PROCEDURE — 86923 COMPATIBILITY TEST ELECTRIC: CPT

## 2022-01-23 PROCEDURE — 76815 OB US LIMITED FETUS(S): CPT

## 2022-01-23 PROCEDURE — 82570 ASSAY OF URINE CREATININE: CPT

## 2022-01-23 PROCEDURE — 86901 BLOOD TYPING SEROLOGIC RH(D): CPT

## 2022-01-23 PROCEDURE — 85025 COMPLETE CBC W/AUTO DIFF WBC: CPT

## 2022-01-23 PROCEDURE — 86703 HIV-1/HIV-2 1 RESULT ANTBDY: CPT

## 2022-01-23 PROCEDURE — 80053 COMPREHEN METABOLIC PANEL: CPT

## 2022-01-23 PROCEDURE — 86780 TREPONEMA PALLIDUM: CPT

## 2022-01-23 PROCEDURE — 76818 FETAL BIOPHYS PROFILE W/NST: CPT

## 2022-01-23 PROCEDURE — 86850 RBC ANTIBODY SCREEN: CPT

## 2022-01-23 PROCEDURE — G0463: CPT

## 2022-01-23 PROCEDURE — 81001 URINALYSIS AUTO W/SCOPE: CPT

## 2022-01-23 PROCEDURE — 85730 THROMBOPLASTIN TIME PARTIAL: CPT

## 2022-01-23 RX ADMIN — Medication 1 MILLIGRAM(S): at 12:14

## 2022-01-23 RX ADMIN — Medication 975 MILLIGRAM(S): at 07:18

## 2022-01-23 RX ADMIN — Medication 500 MILLIGRAM(S): at 12:13

## 2022-01-23 RX ADMIN — Medication 1 TABLET(S): at 12:13

## 2022-01-23 RX ADMIN — ENOXAPARIN SODIUM 40 MILLIGRAM(S): 100 INJECTION SUBCUTANEOUS at 06:24

## 2022-01-23 RX ADMIN — Medication 975 MILLIGRAM(S): at 00:30

## 2022-01-23 RX ADMIN — Medication 975 MILLIGRAM(S): at 06:24

## 2022-01-23 RX ADMIN — Medication 325 MILLIGRAM(S): at 06:24

## 2022-01-23 RX ADMIN — Medication 975 MILLIGRAM(S): at 12:12

## 2022-01-23 NOTE — PROGRESS NOTE ADULT - SUBJECTIVE AND OBJECTIVE BOX
Patient seen resting comfortably.  No new complaints.  Denies HA, CP, SOB, N/V/D, dizziness, palpitations, worsening abdominal pain, worsening vaginal bleeding, or any other concerns. + Ambulation, + void without difficulty, + flatus and tolerating regular diet. Attempting breastfeeding.     Vital Signs Last 24 Hrs  T(C): 37.3 (2022 08:32), Max: 37.3 (2022 08:32)  T(F): 99.1 (2022 08:32), Max: 99.1 (2022 08:32)  HR: 108 (2022 08:32) (102 - 110)  BP: 135/90 (2022 08:32) (124/81 - 136/95)  BP(mean): 105 (2022 00:16) (105 - 105)  RR: 18 (2022 08:32) (16 - 18)  SpO2: 96% (2022 08:32) (96% - 100%)    Gen: A&O x 3, NAD  Chest: CTABL  Cardiac: S1+S2+ RRR  Breast: Soft, nontender, nonengorged  Abdomen: Nontender, nondistended, +BS, Incision clean, dry and intact   Gyn: Minimal bleeding  Extremities: Nontender, DTRS 2+, no worsening edema                                     7.1    17.84 )-----------( 442      ( 2022 07:00 )             23.5         A/P:  Patient is a 31 year old P1 s/p  section complicated with pre-eclampsia without features, POD #2.     -Pain management as needed  -Advance as per protocol  -OOB and ambulate  -Iron supplements  -Case management   -Encourage breastfeeding   -DC home planning
Patient admitted for observation of  newly diagnosed gestational hypertension  patient seen at bedside  offer no acute complaints  denies headache, blurry vision, epigastric pain, leakage of fluids or any other concerns  admit to fetal movement     Vital Signs Last 24 Hrs  T(C): 37.2 (21 Jan 2022 08:20), Max: 37.2 (21 Jan 2022 08:20)  T(F): 99 (21 Jan 2022 08:20), Max: 99 (21 Jan 2022 08:20)  HR: 105 (21 Jan 2022 08:20) (96 - 108)  BP: 135/90 (21 Jan 2022 08:20) (131/63 - 143/94)  BP(mean): 80 (21 Jan 2022 03:56) (80 - 106)  RR: 18 (21 Jan 2022 08:20) (16 - 18)  SpO2: 99% (21 Jan 2022 08:20) (99% - 100%)    gen: and  abd: gravid, soft, non tender   sve def  ext: no tender, DTR 2+ b/l    fh baseline 150 moderate varibility + accels, no decels  toco irregular contractions, not felt by patient    ABO RH Interpretation: O POS (01.20.22 @ 23:39)    Complete Blood Count + Automated Diff (01.19.22 @ 08:44)    WBC Count: 11.17 K/uL    RBC Count: 3.52 M/uL    Hemoglobin: 8.3 g/dL    Hematocrit: 27.5 %    Mean Cell Volume: 78.1 fl    Mean Cell Hemoglobin: 23.6 pg    Mean Cell Hemoglobin Conc: 30.2 gm/dL    Red Cell Distrib Width: 17.6 %    Platelet Count - Automated: 384 K/uL    Auto Neutrophil #: 7.48 K/uL    Auto Lymphocyte #: 2.35 K/uL    Auto Monocyte #: 1.23 K/uL    Auto Eosinophil #: 0.00 K/uL    Auto Basophil #: 0.00 K/uL    Auto Neutrophil %: 67.0: Differential percentages must be correlated with absolute numbers for  clinical significance. %    Auto Lymphocyte %: 21.0 %    Auto Monocyte %: 11.0 %    Auto Eosinophil %: 0.0 %    Auto Basophil %: 0.0 %      
Patient seen at [facility] resting comfortably offers no new complaints. Pt not yet OOB, +ulloa, -flatus; tolerating clear diet. both breastfeeding and bottle feeding. Denies HA, CP, SOB, N/V/D,  no bm; dizziness, palpitations, worsening abdominal pain, worsening vaginal bleeding, or any other concerns.   Vital Signs Last 24 Hrs  T(C): 37.1 (22 Jan 2022 06:05), Max: 37.4 (22 Jan 2022 01:59)  T(F): 98.8 (22 Jan 2022 06:05), Max: 99.3 (22 Jan 2022 01:59)  HR: 104 (22 Jan 2022 06:05) (77 - 134)  BP: 128/68 (22 Jan 2022 06:05) (121/73 - 177/74)  BP(mean): --  RR: 17 (22 Jan 2022 06:05) (16 - 20)  SpO2: 98% (22 Jan 2022 06:05) (98% - 100%)    Gen: A&O x 3, NAD  Chest: CTABL  Cardiac: S1+S2+ RRR  Breast: Soft, nontender, nonengorged  Abdomen: +BS; soft; Nontender, nondistended, dressing removed Incision C/D/I steri strips in place   Gyn: Minimal lochia  Extremities: Nontender, DTRS 2+, no worsening edema                        7.2    18.84 )-----------( 383      ( 22 Jan 2022 07:08 )             23.0       A/P: 31 year old POD #1 s/p c/s, acute on chronic anemia, preeclampsia without severe features, covid pos     -oral iron/vitamin C   -continue covid precautions   -Pain management as needed  -cont post op care  -adv diet to regular with flatus   -OOB and ambulate  -f/u Rpt CBC in am   -encourage insentive spirometer use  -Encourage breastfeeding   -case management   -d/w dr. Mercado

## 2022-03-09 LAB — SURGICAL PATHOLOGY STUDY: SIGNIFICANT CHANGE UP

## 2023-01-23 NOTE — PATIENT PROFILE OB - FUNCTIONAL ASSESSMENT - DAILY ACTIVITY 5.
Eye Clamp Note Details: An eye clamp was used during the procedure. 4 = No assist / stand by assistance